# Patient Record
Sex: FEMALE | URBAN - METROPOLITAN AREA
[De-identification: names, ages, dates, MRNs, and addresses within clinical notes are randomized per-mention and may not be internally consistent; named-entity substitution may affect disease eponyms.]

---

## 2022-04-27 ENCOUNTER — APPOINTMENT (RX ONLY)
Dept: URBAN - METROPOLITAN AREA CLINIC 111 | Facility: CLINIC | Age: 52
Setting detail: DERMATOLOGY
End: 2022-04-27

## 2022-04-27 VITALS — WEIGHT: 154 LBS | HEIGHT: 58 IN

## 2022-04-27 DIAGNOSIS — L81.1 CHLOASMA: ICD-10-CM

## 2022-04-27 PROCEDURE — 99203 OFFICE O/P NEW LOW 30 MIN: CPT

## 2022-04-27 PROCEDURE — ? COUNSELING

## 2022-04-27 PROCEDURE — ? TREATMENT REGIMEN

## 2022-04-27 PROCEDURE — ? PRESCRIPTION

## 2022-04-27 RX ORDER — HYDROQUINONE 4 %
CREAM (GRAM) TOPICAL
Qty: 1 | Refills: 3 | Status: CANCELLED
Stop reason: CLARIF

## 2022-04-27 RX ORDER — FLUOCINOLONE ACETONIDE, HYDROQUINONE, AND TRETINOIN .1; 40; .5 MG/G; MG/G; MG/G
CREAM TOPICAL QHS
Qty: 30 | Refills: 3 | Status: ERX | COMMUNITY
Start: 2022-04-27

## 2022-04-27 RX ADMIN — FLUOCINOLONE ACETONIDE, HYDROQUINONE, AND TRETINOIN: .1; 40; .5 CREAM TOPICAL at 00:00

## 2022-04-27 ASSESSMENT — LOCATION DETAILED DESCRIPTION DERM
LOCATION DETAILED: RIGHT CENTRAL MALAR CHEEK
LOCATION DETAILED: LEFT CENTRAL MALAR CHEEK

## 2022-04-27 ASSESSMENT — LOCATION SIMPLE DESCRIPTION DERM
LOCATION SIMPLE: LEFT CHEEK
LOCATION SIMPLE: RIGHT CHEEK

## 2022-04-27 ASSESSMENT — LOCATION ZONE DERM: LOCATION ZONE: FACE

## 2022-04-27 ASSESSMENT — SEVERITY ASSESSMENT: SEVERITY: MODERATE, MODERATELY DARKER THAN THE SURROUNDING NORMAL SKIN

## 2022-04-27 NOTE — PROCEDURE: TREATMENT REGIMEN
Plan: Discussed Microneedling with tranexamic acid, pt would like to schedule
Otc Regimen: [facility]   [facility].
Detail Level: Zone
Initiate Treatment: Triluma cream ( 12 weeks on and 12 weeks off and repeat )
Discontinue Regimen: Hydroquinone cream